# Patient Record
Sex: MALE | URBAN - METROPOLITAN AREA
[De-identification: names, ages, dates, MRNs, and addresses within clinical notes are randomized per-mention and may not be internally consistent; named-entity substitution may affect disease eponyms.]

---

## 2020-04-20 ENCOUNTER — APPOINTMENT (RX ONLY)
Dept: URBAN - METROPOLITAN AREA CLINIC 4 | Facility: CLINIC | Age: 55
Setting detail: DERMATOLOGY
End: 2020-04-20

## 2020-04-20 PROBLEM — C44.91 BASAL CELL CARCINOMA OF SKIN, UNSPECIFIED: Status: ACTIVE | Noted: 2020-04-20

## 2020-04-20 PROCEDURE — ? MOHS SURGERY PHONE CONSULTATION

## 2020-04-20 NOTE — PROCEDURE: MOHS SURGERY PHONE CONSULTATION
Does The Patient Have An Artificial Heart Valve?: No
Pathology Accession #: [id number]
Referring Provider: Asha Corrales MD
Detail Level: Simple
Which Antibiotic Do They Take For Surgical Prophylaxis?: Amoxicillin (2 grams)
Office Location Of Mohs Surgery: [facility]
Patient Reported Location: left upper lip
Has The Pathology Report Been Received?: Yes
Patient Preferred Phone Number: [phone]

## 2021-02-04 ENCOUNTER — OFFICE VISIT (OUTPATIENT)
Dept: URGENT CARE | Facility: CLINIC | Age: 56
End: 2021-02-04

## 2021-02-04 ENCOUNTER — HOSPITAL ENCOUNTER (OUTPATIENT)
Dept: LAB | Facility: MEDICAL CENTER | Age: 56
End: 2021-02-04
Attending: PHYSICIAN ASSISTANT

## 2021-02-04 VITALS
RESPIRATION RATE: 16 BRPM | OXYGEN SATURATION: 95 % | BODY MASS INDEX: 29.4 KG/M2 | TEMPERATURE: 97.5 F | HEART RATE: 80 BPM | HEIGHT: 71 IN | DIASTOLIC BLOOD PRESSURE: 88 MMHG | SYSTOLIC BLOOD PRESSURE: 136 MMHG | WEIGHT: 210 LBS

## 2021-02-04 DIAGNOSIS — R42 DIZZINESS: ICD-10-CM

## 2021-02-04 DIAGNOSIS — R11.2 NAUSEA AND VOMITING, INTRACTABILITY OF VOMITING NOT SPECIFIED, UNSPECIFIED VOMITING TYPE: ICD-10-CM

## 2021-02-04 LAB
ALBUMIN SERPL BCP-MCNC: 4.7 G/DL (ref 3.2–4.9)
ALBUMIN/GLOB SERPL: 1.6 G/DL
ALP SERPL-CCNC: 82 U/L (ref 30–99)
ALT SERPL-CCNC: 24 U/L (ref 2–50)
ANION GAP SERPL CALC-SCNC: 12 MMOL/L (ref 7–16)
AST SERPL-CCNC: 25 U/L (ref 12–45)
BASOPHILS # BLD AUTO: 0.2 % (ref 0–1.8)
BASOPHILS # BLD: 0.02 K/UL (ref 0–0.12)
BILIRUB SERPL-MCNC: 0.6 MG/DL (ref 0.1–1.5)
BUN SERPL-MCNC: 16 MG/DL (ref 8–22)
CALCIUM SERPL-MCNC: 10 MG/DL (ref 8.4–10.2)
CHLORIDE SERPL-SCNC: 101 MMOL/L (ref 96–112)
CO2 SERPL-SCNC: 26 MMOL/L (ref 20–33)
CREAT SERPL-MCNC: 1.12 MG/DL (ref 0.5–1.4)
EOSINOPHIL # BLD AUTO: 0.02 K/UL (ref 0–0.51)
EOSINOPHIL NFR BLD: 0.2 % (ref 0–6.9)
ERYTHROCYTE [DISTWIDTH] IN BLOOD BY AUTOMATED COUNT: 48.2 FL (ref 35.9–50)
GLOBULIN SER CALC-MCNC: 3 G/DL (ref 1.9–3.5)
GLUCOSE SERPL-MCNC: 103 MG/DL (ref 65–99)
HCT VFR BLD AUTO: 42.9 % (ref 42–52)
HGB BLD-MCNC: 14.1 G/DL (ref 14–18)
IMM GRANULOCYTES # BLD AUTO: 0.03 K/UL (ref 0–0.11)
IMM GRANULOCYTES NFR BLD AUTO: 0.3 % (ref 0–0.9)
LYMPHOCYTES # BLD AUTO: 2.21 K/UL (ref 1–4.8)
LYMPHOCYTES NFR BLD: 20.8 % (ref 22–41)
MCH RBC QN AUTO: 31.4 PG (ref 27–33)
MCHC RBC AUTO-ENTMCNC: 32.9 G/DL (ref 33.7–35.3)
MCV RBC AUTO: 95.5 FL (ref 81.4–97.8)
MONOCYTES # BLD AUTO: 0.61 K/UL (ref 0–0.85)
MONOCYTES NFR BLD AUTO: 5.7 % (ref 0–13.4)
NEUTROPHILS # BLD AUTO: 7.72 K/UL (ref 1.82–7.42)
NEUTROPHILS NFR BLD: 72.8 % (ref 44–72)
NRBC # BLD AUTO: 0 K/UL
NRBC BLD-RTO: 0 /100 WBC
PLATELET # BLD AUTO: 223 K/UL (ref 164–446)
PMV BLD AUTO: 9.6 FL (ref 9–12.9)
POTASSIUM SERPL-SCNC: 4.6 MMOL/L (ref 3.6–5.5)
PROT SERPL-MCNC: 7.7 G/DL (ref 6–8.2)
RBC # BLD AUTO: 4.49 M/UL (ref 4.7–6.1)
SODIUM SERPL-SCNC: 139 MMOL/L (ref 135–145)
WBC # BLD AUTO: 10.6 K/UL (ref 4.8–10.8)

## 2021-02-04 PROCEDURE — 80053 COMPREHEN METABOLIC PANEL: CPT

## 2021-02-04 PROCEDURE — 36415 COLL VENOUS BLD VENIPUNCTURE: CPT

## 2021-02-04 PROCEDURE — 99203 OFFICE O/P NEW LOW 30 MIN: CPT | Performed by: PHYSICIAN ASSISTANT

## 2021-02-04 PROCEDURE — 85025 COMPLETE CBC W/AUTO DIFF WBC: CPT

## 2021-02-04 RX ORDER — MECLIZINE HYDROCHLORIDE 25 MG/1
25 TABLET ORAL 3 TIMES DAILY PRN
Qty: 30 TAB | Refills: 0 | Status: SHIPPED | OUTPATIENT
Start: 2021-02-04 | End: 2021-11-08

## 2021-02-04 RX ORDER — ONDANSETRON 4 MG/1
4 TABLET, ORALLY DISINTEGRATING ORAL EVERY 6 HOURS PRN
Qty: 20 TAB | Refills: 0 | Status: SHIPPED | OUTPATIENT
Start: 2021-02-04 | End: 2021-11-08

## 2021-02-04 ASSESSMENT — ENCOUNTER SYMPTOMS
SINUS PAIN: 0
PHOTOPHOBIA: 0
WHEEZING: 0
SHORTNESS OF BREATH: 0
MYALGIAS: 0
DIARRHEA: 0
SORE THROAT: 0
NECK PAIN: 0
EYE PAIN: 0
WEAKNESS: 0
NAUSEA: 1
VOMITING: 1
DIZZINESS: 1
PALPITATIONS: 0
DOUBLE VISION: 0
CHILLS: 0
FEVER: 0
HEADACHES: 0
COUGH: 0
DIAPHORESIS: 0
TINGLING: 0
ABDOMINAL PAIN: 0
BLURRED VISION: 0

## 2021-02-04 NOTE — PATIENT INSTRUCTIONS
"Labyrinthitis    Labyrinthitis is an inner ear infection. The inner ear is a system of tubes and canals (labyrinth) that are filled with fluid. The inner ear also contains nerve cells that send hearing and balance signals to the brain. When tiny germs (microorganisms) get inside the labyrinth, they harm the cells that send messages to the brain. This can cause changes in hearing and balance.  Labyrinthitis usually develops suddenly and goes away with treatment in a few weeks (acute labyrinthitis). If the infection damages parts of the labyrinth, some symptoms may last for a long time (chronic labyrinthitis).  What are the causes?  Labyrinthitis is most often caused by a virus, such as one that causes:  · Infectious mononucleosis, also called \"mono.\"  · Measles.  · The flu.  · Herpes.  Labyrinthitis can also be caused by bacteria that spread from an infection in the brain or the middle ear (suppurative labyrinthitis). In some cases, the bacteria may produce a poison (toxin) that gets inside the labyrinth (serous labyrinthitis).  What increases the risk?  You may be at greater risk for labyrinthitis if you:  · Recently had a mouth, nose, or throat infection (upper respiratory infection) or an ear infection.  · Drink a lot of alcohol.  · Smoke.  · Use certain drugs.  · Are not well-rested (are fatigued).  · Are experiencing a lot of stress.  · Have allergies.  What are the signs or symptoms?  Symptoms of labyrinthitis usually start suddenly. The symptoms may range from mild to severe, and may include:  · Dizziness.  · Hearing loss.  · A feeling that you or your surroundings are moving when they are not (vertigo).  · Ringing in your ear (tinnitus).  · Nausea and vomiting.  · Trouble focusing your eyes.  Symptoms of chronic labyrinthitis may include:  · Fatigue.  · Confusion.  · Hearing loss.  · Tinnitus.  · Poor balance.  · Vertigo after sudden head movements.  How is this diagnosed?  This condition may be diagnosed " based on:  · Your symptoms and medical history. Your health care provider may ask about any dizziness or hearing loss you have and any recent upper respiratory infections.  · A physical exam that involves:  ? Checking your ears for infection.  ? Testing your balance.  ? Checking your eye movement.  · Hearing tests.  · Imaging tests, such as CT scan or MRI.  · Tests of your eye movements (electronystagmogram, ENG).  How is this treated?    Treatment depends on the cause. If your condition is caused by bacteria, you may need antibiotic medicine. If it is caused by a virus, it may get better on its own.  Regardless of the cause, you may be treated with:  · Medicines to:  ? Stop dizziness.  ? Relieve nausea.  ? Reduce inflammation.  ? Speed up your recovery.  · Rest. You may be asked to limit your activities until dizziness goes away.  · IV fluids. These may be given at a hospital. You may need IV fluids if you have severe nausea and vomiting.  · Physical therapy. A therapist can teach you exercises to help you adjust to feeling dizzy (vestibular rehabilitation exercises). You may need this if you have dizziness that does not go away.  Follow these instructions at home:  Medicines  · Take over-the-counter and prescription medicines only as told by your health care provider.  · If you were prescribed an antibiotic medicine, take it exactly as told by your health care provider. Do not stop taking the antibiotic even if you start to feel better.  Activity  · Rest as much as possible.  · Limit your activity as directed. Ask your health care provider what activities are safe for you.  · Do not make sudden movements until any dizziness goes away.  · If physical therapy was prescribed, do exercises as directed.  General instructions  · Avoid loud noises and bright lights.  · Do not drive until your health care provider says that this is safe for you.  · Drink enough fluid to keep your urine pale yellow.  · Keep all follow-up  visits as told by your health care provider. This is important.  Contact a health care provider if you have:  · Symptoms that do not get better with medicine.  · Symptoms that last longer than two weeks.  · A fever.  Get help right away if you have:  · Nausea or vomiting that is severe or does not go away.  · Severe dizziness.  · Sudden hearing loss.  Summary  · Labyrinthitis is an infection of the inner ear. It can cause changes in hearing and balance.  · Symptoms usually start suddenly and include dizziness, hearing loss, and nausea and vomiting. You may also have ringing in your ear (tinnitus), trouble focusing your eyes, and a feeling that you or your surroundings are moving when they are not (vertigo).  · If the condition lasts more than a few weeks, symptoms may include fatigue, confusion, hearing loss, poor balance, tinnitus, and vertigo.  · Treatment depends on the cause. If your labyrinthitis is caused by bacteria, you may need antibiotic medicine. If your labyrinthitis is caused by a virus, it may get better on its own.  · Follow your health care provider's instructions, including how to take medicines, what activities to avoid, and when to get medical help.  This information is not intended to replace advice given to you by your health care provider. Make sure you discuss any questions you have with your health care provider.  Document Released: 01/08/2016 Document Revised: 10/07/2019 Document Reviewed: 12/29/2018  Elsevier Patient Education © 2020 [employer]  Meniere Disease    Meniere disease is an inner ear disorder. It causes attacks of a spinning sensation (vertigo), dizziness, and ringing in the ear (tinnitus). It also causes hearing loss and a feeling of fullness or pressure in the ear. This is a lifelong condition, and it may get worse over time.  You may have drop attacks or severe dizziness that makes you fall. A drop attack is when you suddenly fall without losing consciousness and you  quickly recover after a few seconds or minutes.  What are the causes?  This condition is caused by having too much of the fluid that is in your inner ear (endolymph). When fluid builds up in your inner ear, it affects the nerves that control balance and hearing. The reason for the fluid buildup is not known. Possible causes include:  · Allergies.  · An abnormal reaction of the body’s defense system (autoimmune disease).  · Viral infection of the inner ear.  · Head injury.  What increases the risk?  You are more likely to develop this condition if:  · You are older than age 40.  · You have a family history of Meniere disease.  · You have a history of autoimmune disease.  · You have a history of migraine headaches.  What are the signs or symptoms?  Symptoms of this condition can come and go and may last for up to 4 hours at a time. Symptoms usually start in one ear. They may become more frequent and eventually involve both ears. Symptoms can include:  · Fullness and pressure in your ear.  · Roaring or ringing in your ear.  · Vertigo and loss of balance.  · Dizziness.  · Decreased hearing.  · Nausea and vomiting.  How is this diagnosed?  This condition is diagnosed based on:  · A physical exam.  · Tests , such as:  ? A hearing test (audiogram).  ? An electronystagmogram. This tests your balance nerve (vestibular nerve).  ? Imaging studies of your inner ear, such as CT scan or MRI.  ? Other balance tests, such as rotational or balance platform tests.  How is this treated?  There is no cure for this condition, but treatment can help to manage your symptoms. Treatment may include:  · A low-salt diet. Limiting salt may help to reduce fluid in the body and relieve symptoms.  · Oral or injected medicines to reduce or control:  ? Vertigo.  ? Nausea.  ? Fluid retention.  ? Dizziness.  · Use of an air pressure pulse generator. This is a machine that sends small pressure pulses into your ear canal.  · Hearing aids.  · Inner ear  surgery. This is rare.  When you have symptoms, it can be helpful to lie down on a flat surface and focus your eyes on one object that does not move. Try to stay in that position until your symptoms go away.  Follow these instructions at home:  Eating and drinking  · Eat the same amount of food at the same time every day, including snacks.  · Do not skip meals.  · Avoid caffeine.  · Drink enough fluids to keep your urine clear or pale yellow.  · Limit alcoholic drinks to one drink a day for non-pregnant women and 2 drinks a day for men. One drink equals 12 oz of beer, 5 oz of wine, or 1½ oz of hard liquor.  · Limit the salt (sodium) in your diet as told by your health care provider. Check ingredients and nutrition facts on packaged foods and beverages.  · Do not eat foods that contain monosodium glutamate (MSG).  General instructions  · Do not use any products that contain nicotine or tobacco, such as cigarettes and e-cigarettes. If you need help quitting, ask your health care provider.  · Take over-the-counter and prescription medicines only as told by your health care provider.  · Find ways to reduce or avoid stress. If you need help with this, ask your health care provider.  · Do not drive if you have vertigo or dizziness.  Contact a health care provider if:  · You have symptoms that last longer than 4 hours.  · You have new or worse symptoms.  Get help right away if:  · You have been vomiting for 24 hours.  · You cannot keep fluids down.  · You have chest pain or trouble breathing.  Summary  · Meniere disease is an inner ear disorder. It causes attacks of a spinning sensation (vertigo), dizziness, and ringing in the ear (tinnitus). It also causes hearing loss and a feeling of fullness or pressure in the ear.  · Symptoms of this condition can come and go and may last for up to 4 hours at a time.  · When you have symptoms, it can be helpful to lie down on a flat surface and focus your eyes on one object that does  not move. Try to stay in that position until your symptoms go away.  This information is not intended to replace advice given to you by your health care provider. Make sure you discuss any questions you have with your health care provider.  Document Released: 12/15/2001 Document Revised: 11/30/2018 Document Reviewed: 11/08/2017  Elsevier Patient Education © 2020 Elsevier Inc.  Vertigo  Vertigo is the feeling that you or your surroundings are moving when they are not. This feeling can come and go at any time. Vertigo often goes away on its own. Vertigo can be dangerous if it occurs while you are doing something that could endanger you or others, such as driving or operating machinery.  Your health care provider will do tests to determine the cause of your vertigo. Tests will also help your health care provider decide how best to treat your condition.  Follow these instructions at home:  Eating and drinking         · Drink enough fluid to keep your urine pale yellow.  · Do not drink alcohol.  Activity  · Return to your normal activities as told by your health care provider. Ask your health care provider what activities are safe for you.  · In the morning, first sit up on the side of the bed. When you feel okay, stand slowly while you hold onto something until you know that your balance is fine.  · Move slowly. Avoid sudden body or head movements or certain positions, as told by your health care provider.  · If you have trouble walking or keeping your balance, try using a cane for stability. If you feel dizzy or unstable, sit down right away.  · Avoid doing any tasks that would cause danger to you or others if vertigo occurs.  · Avoid bending down if you feel dizzy. Place items in your home so that they are easy for you to reach without leaning over.  · Do not drive or use heavy machinery if you feel dizzy.  General instructions  · Take over-the-counter and prescription medicines only as told by your health care  provider.  · Keep all follow-up visits as told by your health care provider. This is important.  Contact a health care provider if:  · Your medicines do not relieve your vertigo or they make it worse.  · You have a fever.  · Your condition gets worse or you develop new symptoms.  · Your family or friends notice any behavioral changes.  · Your nausea or vomiting gets worse.  · You have numbness or a prickling and tingling sensation in part of your body.  Get help right away if you:  · Have difficulty moving or speaking.  · Are always dizzy.  · Faint.  · Develop severe headaches.  · Have weakness in your hands, arms, or legs.  · Have changes in your hearing or vision.  · Develop a stiff neck.  · Develop sensitivity to light.  Summary  · Vertigo is the feeling that you or your surroundings are moving when they are not.  · Your health care provider will do tests to determine the cause of your vertigo.  · Follow instructions for home care. You may be told to avoid certain tasks, positions, or movements.  · Contact a health care provider if your medicines do not relieve your symptoms, or if you have a fever, nausea, vomiting, or changes in behavior.  · Get help right away if you have severe headaches or difficulty speaking, or you develop hearing or vision problems.  This information is not intended to replace advice given to you by your health care provider. Make sure you discuss any questions you have with your health care provider.  Document Released: 09/27/2006 Document Revised: 11/11/2019 Document Reviewed: 11/11/2019  [employer] Patient Education © 2020 Elsevier Inc.

## 2021-02-04 NOTE — PROGRESS NOTES
Subjective:   Brandon Spaulding is a 55 y.o. male who presents for Dizziness (x 2 weeks off / on, dizziness, cold sweats.  Vomiting last night)    HPI:  This is a very pleasant 55-year-old male presenting to the clinic with intermittent bouts of dizziness and nausea x2 weeks.  Patient states his dizziness started randomly.  He describes random episodes of dizziness lasting seconds to minutes then resolving.  He describes his dizziness episodes as feeling off balance/the room spinning.  Occasionally he has some mild cold sweats involved.  He has had a couple instances where he vomited due to the dizziness.  The dizziness episodes do not seem to be brought on with movement.  He denies any lightheadedness or presyncope.  Denies any associated chest pain, shortness of breath, headache, paresthesias or visual changes.  He has not started any new medications.  He does describe some intermittent popping in both his ears as well as some tendinitis he states he has had this for years.  Denies any ear pain, fevers, chills or myalgias.  Denies any preceding upper respiratory infection.      Review of Systems   Constitutional: Negative for chills, diaphoresis, fever and malaise/fatigue.   HENT: Positive for tinnitus. Negative for congestion, ear pain, hearing loss, sinus pain and sore throat.    Eyes: Negative for blurred vision, double vision, photophobia and pain.   Respiratory: Negative for cough, shortness of breath and wheezing.    Cardiovascular: Negative for chest pain and palpitations.   Gastrointestinal: Positive for nausea and vomiting. Negative for abdominal pain and diarrhea.   Musculoskeletal: Negative for myalgias and neck pain.   Neurological: Positive for dizziness. Negative for tingling, weakness and headaches.       Medications:    • meclizine Tabs  • ondansetron Tbdp    Allergies: Anesthesia s-i-40 [kdc:egg phospholipids+sodium metabisulfite+soybean oil+propofol]    Problem List: Brandon Spaulding does not have a  "problem list on file.    Surgical History:  No past surgical history on file.    Past Social Hx: Brandon Spaulding  reports that he has never smoked. He has never used smokeless tobacco.     Past Family Hx:  Brandon Spaulding family history is not on file.     Problem list, medications, and allergies reviewed by myself today in Epic.     Objective:     /88 (BP Location: Left arm, Patient Position: Sitting, BP Cuff Size: Large adult)   Pulse 80   Temp 36.4 °C (97.5 °F) (Temporal)   Resp 16   Ht 1.803 m (5' 11\")   Wt 95.3 kg (210 lb)   SpO2 95%   BMI 29.29 kg/m²     Physical Exam  Constitutional:       General: He is not in acute distress.     Appearance: Normal appearance. He is not ill-appearing, toxic-appearing or diaphoretic.   HENT:      Head: Normocephalic and atraumatic.      Right Ear: Ear canal and external ear normal.      Left Ear: Ear canal and external ear normal.      Ears:      Comments: Middle ear effusions bilaterally.  TMs nonerythematous or bulging.     Nose: Nose normal. No congestion or rhinorrhea.      Mouth/Throat:      Mouth: Mucous membranes are moist.      Pharynx: No oropharyngeal exudate or posterior oropharyngeal erythema.   Eyes:      Extraocular Movements: Extraocular movements intact.      Conjunctiva/sclera: Conjunctivae normal.      Pupils: Pupils are equal, round, and reactive to light.   Neck:      Musculoskeletal: Normal range of motion. No neck rigidity or muscular tenderness.      Vascular: No carotid bruit.   Cardiovascular:      Rate and Rhythm: Normal rate and regular rhythm.      Pulses: Normal pulses.      Heart sounds: Normal heart sounds.   Pulmonary:      Effort: Pulmonary effort is normal.      Breath sounds: Wheezing (Mild wheezing appreciated in all lobes.) present.   Abdominal:      General: Bowel sounds are normal.      Palpations: Abdomen is soft.      Tenderness: There is no abdominal tenderness. There is no guarding.   Lymphadenopathy:      Cervical: No " cervical adenopathy.   Skin:     General: Skin is warm and dry.      Capillary Refill: Capillary refill takes less than 2 seconds.   Neurological:      General: No focal deficit present.      Mental Status: He is alert and oriented to person, place, and time. Mental status is at baseline.      Cranial Nerves: No cranial nerve deficit.      Sensory: No sensory deficit.      Motor: No weakness.      Coordination: Coordination normal.      Gait: Gait normal.      Comments: Negative Rocio-Hallpike   Psychiatric:         Mood and Affect: Mood normal.         Thought Content: Thought content normal.           Assessment/Plan:     Diagnosis and associated orders:     1. Dizziness  meclizine (ANTIVERT) 25 MG Tab    CBC WITH DIFFERENTIAL    Comp Metabolic Panel   2. Nausea and vomiting, intractability of vomiting not specified, unspecified vomiting type  ondansetron (ZOFRAN ODT) 4 MG TABLET DISPERSIBLE    CBC WITH DIFFERENTIAL    Comp Metabolic Panel      Comments/MDM:       • Differential diagnoses and treatment options were discussed with the patient at length today.  Differentials include vertigo versus electrolyte imbalance versus inner ear disorder.  Patient had normal and stable vital signs.  Negative Odell-Hallpike in clinic.  Normal neurological exam.  Normal heart sounds.  Mild wheezing appreciated in all lung fields are the patient is a longtime smoker.  He had middle ear effusions bilaterally.  At this time I recommended starting Flonase 2 sprays per nostril 1 time daily.  May also use meclizine and Zofran as directed for dizziness and nausea.  He has not had blood work done in many years I will order some basic lab work and follow-up once available.  A referral was also placed to follow-up with ENT child if symptoms persist.  Discussed potential red flag symptoms that would warrant emergency department follow-up.  The patient agreed and understood this plan of care.           Differential diagnosis, natural history,  supportive care, and indications for immediate follow-up discussed.    Advised the patient to follow-up with the primary care physician for recheck, reevaluation, and consideration of further management.    Please note that this dictation was created using voice recognition software. I have made reasonable attempt to correct obvious errors, but I expect that there are errors of grammar and possibly content that I did not discover before finalizing the note.    This note was electronically signed by SANDY Carver PA-C

## 2021-03-15 DIAGNOSIS — Z23 NEED FOR VACCINATION: ICD-10-CM

## 2021-03-25 ENCOUNTER — TELEPHONE (OUTPATIENT)
Dept: URGENT CARE | Facility: CLINIC | Age: 56
End: 2021-03-25

## 2021-03-25 DIAGNOSIS — R11.2 NAUSEA AND VOMITING, INTRACTABILITY OF VOMITING NOT SPECIFIED, UNSPECIFIED VOMITING TYPE: ICD-10-CM

## 2021-03-25 DIAGNOSIS — R42 DIZZINESS: ICD-10-CM

## 2021-03-25 RX ORDER — MECLIZINE HYDROCHLORIDE 25 MG/1
25 TABLET ORAL 3 TIMES DAILY PRN
Qty: 60 TABLET | Refills: 0 | Status: SHIPPED | OUTPATIENT
Start: 2021-03-25 | End: 2021-11-08

## 2021-03-25 RX ORDER — ONDANSETRON 4 MG/1
4 TABLET, ORALLY DISINTEGRATING ORAL EVERY 6 HOURS PRN
Qty: 20 TABLET | Refills: 0 | Status: SHIPPED | OUTPATIENT
Start: 2021-03-25 | End: 2021-11-08

## 2021-03-25 NOTE — TELEPHONE ENCOUNTER
Patient called office and left a voice message, patient stated he was seen on Feb 4, he got referral for ENT and his appt is until April 21st, patient state he already run out of the medications he was prescribe Meclizine and zofran and he likes to know if you can send a refill.  If you decide to do the refill patient is requesting a call back at [phone] to let you know what pharmacy he likes to use. He is going to be out of town for 1 wk. Please advice.

## 2021-03-25 NOTE — PROGRESS NOTES
Spoke with the patient this afternoon.  His symptoms fully improved for a few weeks following his last visit at the beginning of February.  Over the last week or 2 they have returned.  He is scheduled to follow-up with ENT next month.  Is requesting a refill of his previous medications.  I sent his refill into the pharmacy in [place].  Recommended him performing the Epley maneuver.  Discussed red flag symptoms that would warrant emergent follow-up.  Encouraged to call with any questions or concerns.

## 2021-07-06 ENCOUNTER — TELEPHONE (OUTPATIENT)
Dept: SCHEDULING | Facility: IMAGING CENTER | Age: 56
End: 2021-07-06

## 2021-09-20 ENCOUNTER — TELEPHONE (OUTPATIENT)
Dept: SCHEDULING | Facility: IMAGING CENTER | Age: 56
End: 2021-09-20

## 2021-11-05 SDOH — HEALTH STABILITY: PHYSICAL HEALTH: ON AVERAGE, HOW MANY DAYS PER WEEK DO YOU ENGAGE IN MODERATE TO STRENUOUS EXERCISE (LIKE A BRISK WALK)?: 3 DAYS

## 2021-11-05 SDOH — ECONOMIC STABILITY: TRANSPORTATION INSECURITY
IN THE PAST 12 MONTHS, HAS LACK OF RELIABLE TRANSPORTATION KEPT YOU FROM MEDICAL APPOINTMENTS, MEETINGS, WORK OR FROM GETTING THINGS NEEDED FOR DAILY LIVING?: NO

## 2021-11-05 SDOH — ECONOMIC STABILITY: FOOD INSECURITY: WITHIN THE PAST 12 MONTHS, YOU WORRIED THAT YOUR FOOD WOULD RUN OUT BEFORE YOU GOT MONEY TO BUY MORE.: NEVER TRUE

## 2021-11-05 SDOH — ECONOMIC STABILITY: INCOME INSECURITY: IN THE LAST 12 MONTHS, WAS THERE A TIME WHEN YOU WERE NOT ABLE TO PAY THE MORTGAGE OR RENT ON TIME?: NO

## 2021-11-05 SDOH — ECONOMIC STABILITY: HOUSING INSECURITY

## 2021-11-05 SDOH — ECONOMIC STABILITY: HOUSING INSECURITY
IN THE LAST 12 MONTHS, WAS THERE A TIME WHEN YOU DID NOT HAVE A STEADY PLACE TO SLEEP OR SLEPT IN A SHELTER (INCLUDING NOW)?: NO

## 2021-11-05 SDOH — HEALTH STABILITY: MENTAL HEALTH
STRESS IS WHEN SOMEONE FEELS TENSE, NERVOUS, ANXIOUS, OR CAN'T SLEEP AT NIGHT BECAUSE THEIR MIND IS TROUBLED. HOW STRESSED ARE YOU?: RATHER MUCH

## 2021-11-05 SDOH — ECONOMIC STABILITY: TRANSPORTATION INSECURITY
IN THE PAST 12 MONTHS, HAS LACK OF TRANSPORTATION KEPT YOU FROM MEETINGS, WORK, OR FROM GETTING THINGS NEEDED FOR DAILY LIVING?: NO

## 2021-11-05 SDOH — ECONOMIC STABILITY: INCOME INSECURITY: HOW HARD IS IT FOR YOU TO PAY FOR THE VERY BASICS LIKE FOOD, HOUSING, MEDICAL CARE, AND HEATING?: NOT HARD AT ALL

## 2021-11-05 SDOH — ECONOMIC STABILITY: FOOD INSECURITY: WITHIN THE PAST 12 MONTHS, THE FOOD YOU BOUGHT JUST DIDN'T LAST AND YOU DIDN'T HAVE MONEY TO GET MORE.: NEVER TRUE

## 2021-11-05 SDOH — ECONOMIC STABILITY: TRANSPORTATION INSECURITY
IN THE PAST 12 MONTHS, HAS THE LACK OF TRANSPORTATION KEPT YOU FROM MEDICAL APPOINTMENTS OR FROM GETTING MEDICATIONS?: NO

## 2021-11-05 SDOH — HEALTH STABILITY: PHYSICAL HEALTH: ON AVERAGE, HOW MANY MINUTES DO YOU ENGAGE IN EXERCISE AT THIS LEVEL?: 30 MIN

## 2021-11-05 ASSESSMENT — SOCIAL DETERMINANTS OF HEALTH (SDOH)
HOW MANY DRINKS CONTAINING ALCOHOL DO YOU HAVE ON A TYPICAL DAY WHEN YOU ARE DRINKING: 1 OR 2
HOW OFTEN DO YOU GET TOGETHER WITH FRIENDS OR RELATIVES?: NEVER
DO YOU BELONG TO ANY CLUBS OR ORGANIZATIONS SUCH AS CHURCH GROUPS UNIONS, FRATERNAL OR ATHLETIC GROUPS, OR SCHOOL GROUPS?: YES
IN A TYPICAL WEEK, HOW MANY TIMES DO YOU TALK ON THE PHONE WITH FAMILY, FRIENDS, OR NEIGHBORS?: MORE THAN THREE TIMES A WEEK
IN A TYPICAL WEEK, HOW MANY TIMES DO YOU TALK ON THE PHONE WITH FAMILY, FRIENDS, OR NEIGHBORS?: MORE THAN THREE TIMES A WEEK
HOW OFTEN DO YOU HAVE SIX OR MORE DRINKS ON ONE OCCASION: NEVER
WITHIN THE PAST 12 MONTHS, YOU WORRIED THAT YOUR FOOD WOULD RUN OUT BEFORE YOU GOT THE MONEY TO BUY MORE: NEVER TRUE
HOW HARD IS IT FOR YOU TO PAY FOR THE VERY BASICS LIKE FOOD, HOUSING, MEDICAL CARE, AND HEATING?: NOT HARD AT ALL
DO YOU BELONG TO ANY CLUBS OR ORGANIZATIONS SUCH AS CHURCH GROUPS UNIONS, FRATERNAL OR ATHLETIC GROUPS, OR SCHOOL GROUPS?: YES
HOW OFTEN DO YOU GET TOGETHER WITH FRIENDS OR RELATIVES?: NEVER
HOW OFTEN DO YOU HAVE A DRINK CONTAINING ALCOHOL: MONTHLY OR LESS
HOW OFTEN DO YOU ATTEND CHURCH OR RELIGIOUS SERVICES?: MORE THAN 4 TIMES PER YEAR
HOW OFTEN DO YOU ATTENT MEETINGS OF THE CLUB OR ORGANIZATION YOU BELONG TO?: MORE THAN 4 TIMES PER YEAR
HOW OFTEN DO YOU ATTENT MEETINGS OF THE CLUB OR ORGANIZATION YOU BELONG TO?: MORE THAN 4 TIMES PER YEAR
HOW OFTEN DO YOU ATTEND CHURCH OR RELIGIOUS SERVICES?: MORE THAN 4 TIMES PER YEAR

## 2021-11-05 ASSESSMENT — LIFESTYLE VARIABLES
HOW MANY STANDARD DRINKS CONTAINING ALCOHOL DO YOU HAVE ON A TYPICAL DAY: 1 OR 2
HOW OFTEN DO YOU HAVE A DRINK CONTAINING ALCOHOL: MONTHLY OR LESS
HOW OFTEN DO YOU HAVE SIX OR MORE DRINKS ON ONE OCCASION: NEVER

## 2021-11-08 ENCOUNTER — TELEPHONE (OUTPATIENT)
Dept: MEDICAL GROUP | Facility: PHYSICIAN GROUP | Age: 56
End: 2021-11-08

## 2021-11-08 ENCOUNTER — OFFICE VISIT (OUTPATIENT)
Dept: MEDICAL GROUP | Facility: PHYSICIAN GROUP | Age: 56
End: 2021-11-08
Payer: COMMERCIAL

## 2021-11-08 VITALS
HEART RATE: 68 BPM | HEIGHT: 71 IN | DIASTOLIC BLOOD PRESSURE: 80 MMHG | WEIGHT: 189.2 LBS | OXYGEN SATURATION: 99 % | SYSTOLIC BLOOD PRESSURE: 138 MMHG | TEMPERATURE: 97.2 F | BODY MASS INDEX: 26.49 KG/M2

## 2021-11-08 DIAGNOSIS — R21 RASH: ICD-10-CM

## 2021-11-08 DIAGNOSIS — K40.90 UNILATERAL INGUINAL HERNIA WITHOUT OBSTRUCTION OR GANGRENE, RECURRENCE NOT SPECIFIED: ICD-10-CM

## 2021-11-08 DIAGNOSIS — H93.13 TINNITUS OF BOTH EARS: ICD-10-CM

## 2021-11-08 DIAGNOSIS — Z00.00 WELL ADULT EXAM: ICD-10-CM

## 2021-11-08 DIAGNOSIS — L29.0 PRURITUS ANI: ICD-10-CM

## 2021-11-08 PROCEDURE — 99214 OFFICE O/P EST MOD 30 MIN: CPT | Performed by: FAMILY MEDICINE

## 2021-11-08 RX ORDER — CALCIUM CARBONATE 260MG(650)
TABLET,CHEWABLE ORAL
COMMUNITY
Start: 2018-02-06

## 2021-11-08 RX ORDER — NYSTATIN 100000 U/G
1 CREAM TOPICAL 2 TIMES DAILY
Qty: 60 APPLICATION | Refills: 0 | Status: SHIPPED | OUTPATIENT
Start: 2021-11-08 | End: 2021-12-08

## 2021-11-08 RX ORDER — NEOMYCIN SULFATE, POLYMYXIN B SULFATE, AND DEXAMETHASONE 3.5; 10000; 1 MG/G; [USP'U]/G; MG/G
OINTMENT OPHTHALMIC
COMMUNITY
Start: 2021-10-30

## 2021-11-08 ASSESSMENT — PATIENT HEALTH QUESTIONNAIRE - PHQ9: CLINICAL INTERPRETATION OF PHQ2 SCORE: 0

## 2021-11-08 ASSESSMENT — FIBROSIS 4 INDEX: FIB4 SCORE: 1.33

## 2021-11-08 NOTE — TELEPHONE ENCOUNTER
VOICEMAIL  1. Caller Name: Brandon Spaulding                        Call Back Number: [phone]    2. Message: Patient LVM stating he was seen earlier today. Patient wanted to know if he needed new order because his insurance does not cover [facility] and he will be needing to go to [facility] instead.    3. Patient approves office to leave a detailed voicemail/MyChart message: N\A

## 2021-11-08 NOTE — ASSESSMENT & PLAN NOTE
Chronic issue  Has had a longstanding lump to right inguinal region. Growing in size  Would like evaluation

## 2021-11-08 NOTE — TELEPHONE ENCOUNTER
Phone Number Called: [phone]    Call outcome: Did not leave a detailed message. Requested patient to call back.    Message: LVM to please call back in regards to VM. Patient will Just have to take the paper order he got today when he goes to [facility],

## 2021-11-08 NOTE — PROGRESS NOTES
Subjective:     CC:  Diagnoses of Tinnitus of both ears, Rash, Pruritus ani, Well adult exam, and Unilateral inguinal hernia without obstruction or gangrene, recurrence not specified were pertinent to this visit.    HISTORY OF THE PRESENT ILLNESS: Patient is a 56 y.o. male. This pleasant patient is here today to establish care and discuss the following problems.     Tinnitus of both ears  Chronic issue  Has had tinnitus for a long time  Denies any hearing loss. Had recent hearing test.   Also endorses eustachian tube dysfunction       Pruritus ani  Chronic issue  Has had a perianal rash and pruritis   He has had to shower twice daily to keep it calm  Has tried to increase his fiber intake   Has tried buttpaste and vaseline which helps some and relieves the itchiness.     Inguinal hernia  Chronic issue  Has had a longstanding lump to right inguinal region. Growing in size  Would like evaluation       Allergies: Anesthesia s-i-40 [kdc:egg phospholipids+sodium metabisulfite+soybean oil+propofol]    Current Outpatient Medications Ordered in Epic   Medication Sig Dispense Refill   • neomycin-polymixin-dexamethasone (MAXITROL) 3.5-46650-4.1 Ointment ophthalmic ointment      • Zinc 10 MG Lozenge      • Vitamin D-Vitamin K (VITAMIN K2-VITAMIN D3)  MCG-UNIT Cap      • Magnesium 100 MG Cap      • nystatin (MYCOSTATIN) [id number] UNIT/GM Cream topical cream Apply 1 g topically 2 times a day for 30 days. 60 Application 0     No current Epic-ordered facility-administered medications on file.       History reviewed. No pertinent past medical history.      Health Maintenance: Completed    ROS:   Gen: no fevers/chills, no changes in weight  Eyes: no changes in vision  ENT: no sore throat, no hearing loss, no bloody nose  Pulm: no sob, no cough  CV: no chest pain, no palpitations  GI: no nausea/vomiting, no diarrhea  Skin: no rash      Objective:     Exam: /80 (BP Location: Right arm, Patient Position: Sitting, BP Cuff  "Size: Adult)   Pulse 68   Temp 36.2 °C (97.2 °F) (Temporal)   Ht 1.803 m (5' 11\")   Wt 85.8 kg (189 lb 3.2 oz)   SpO2 99%  Body mass index is 26.39 kg/m².      General: Normal appearing. No distress.  Pulmonary: Clear to ausculation.  Normal effort. No rales, ronchi, or wheezing.  Cardiovascular: Regular rate and rhythm without murmur. Radial pulses are intact and equal bilaterally.  Abdomen: Soft, nontender, nondistended. Normal bowel sounds. Liver and spleen are not palpable  Neurologic: Moves all extremities  Skin: Inspection of the perianal region shows evidence of an erythematous rash with mild skin breakdown at the gluteal fold.  No satellite lesions.  Musculoskeletal: Normal gait. No extremity cyanosis, clubbing, or edema.  There is a right pelvic lump that is compressible.  Psych: Normal mood and affect. Alert and oriented x3. Judgment and insight is normal.      Assessment & Plan:   56 y.o. male with the following -    1. Tinnitus of both ears  This is a chronic condition.  There may be an element of eustachian tube dysfunction.  I recommend a trial of Allegra-D.    2. Rash  3. Pruritus ani  Chronic condition.  I would like to do a trial of nystatin cream.  We will reassess in 3 months.  Small threshold for referral to dermatology.  - nystatin (MYCOSTATIN) [id number] UNIT/GM Cream topical cream; Apply 1 g topically 2 times a day for 30 days.  Dispense: 60 Application; Refill: 0    4. Well adult exam  - nystatin (MYCOSTATIN) [id number] UNIT/GM Cream topical cream; Apply 1 g topically 2 times a day for 30 days.  Dispense: 60 Application; Refill: 0  - CBC WITHOUT DIFFERENTIAL; Future  - Comp Metabolic Panel; Future  - HEMOGLOBIN A1C; Future  - Lipid Profile; Future    5. Unilateral inguinal hernia without obstruction or gangrene, recurrence not specified  Chronic condition.  I will proceed with ultrasonography to further evaluate for possible hernia.  - US-INGUINAL HERNIA; Future      Return in about 3 months " (around 2/8/2022).    Please note that this dictation was created using voice recognition software. I have made every reasonable attempt to correct obvious errors, but I expect that there are errors of grammar and possibly content that I did not discover before finalizing the note.

## 2021-11-08 NOTE — ASSESSMENT & PLAN NOTE
Chronic issue  Has had a perianal rash and pruritis   He has had to shower twice daily to keep it calm  Has tried to increase his fiber intake   Has tried buttpaste and vaseline which helps some and relieves the itchiness.

## 2021-11-09 LAB
ALBUMIN SERPL-MCNC: 4.6 G/DL (ref 3.8–4.9)
ALBUMIN/GLOB SERPL: 1.8 {RATIO} (ref 1.2–2.2)
ALP SERPL-CCNC: 81 IU/L (ref 44–121)
ALT SERPL-CCNC: 20 IU/L (ref 0–44)
AST SERPL-CCNC: 17 IU/L (ref 0–40)
BASOPHILS # BLD AUTO: 0 X10E3/UL (ref 0–0.2)
BASOPHILS NFR BLD AUTO: 0 %
BILIRUB SERPL-MCNC: 0.4 MG/DL (ref 0–1.2)
BUN SERPL-MCNC: 12 MG/DL (ref 6–24)
BUN/CREAT SERPL: 11 (ref 9–20)
CALCIUM SERPL-MCNC: 9.6 MG/DL (ref 8.7–10.2)
CHLORIDE SERPL-SCNC: 105 MMOL/L (ref 96–106)
CHOLEST SERPL-MCNC: 206 MG/DL (ref 100–199)
CO2 SERPL-SCNC: 22 MMOL/L (ref 20–29)
CREAT SERPL-MCNC: 1.06 MG/DL (ref 0.76–1.27)
EOSINOPHIL # BLD AUTO: 0.1 X10E3/UL (ref 0–0.4)
EOSINOPHIL NFR BLD AUTO: 1 %
ERYTHROCYTE [DISTWIDTH] IN BLOOD BY AUTOMATED COUNT: 13.1 % (ref 11.6–15.4)
GLOBULIN SER CALC-MCNC: 2.6 G/DL (ref 1.5–4.5)
GLUCOSE SERPL-MCNC: 105 MG/DL (ref 65–99)
HBA1C MFR BLD: 5.9 % (ref 4.8–5.6)
HCT VFR BLD AUTO: 41.9 % (ref 37.5–51)
HDLC SERPL-MCNC: 41 MG/DL
HGB BLD-MCNC: 14.3 G/DL (ref 13–17.7)
IMM GRANULOCYTES # BLD AUTO: 0 X10E3/UL (ref 0–0.1)
IMM GRANULOCYTES NFR BLD AUTO: 0 %
IMMATURE CELLS  115398: NORMAL
LABORATORY COMMENT REPORT: ABNORMAL
LDLC SERPL CALC-MCNC: 154 MG/DL (ref 0–99)
LYMPHOCYTES # BLD AUTO: 1.7 X10E3/UL (ref 0.7–3.1)
LYMPHOCYTES NFR BLD AUTO: 24 %
MCH RBC QN AUTO: 32.1 PG (ref 26.6–33)
MCHC RBC AUTO-ENTMCNC: 34.1 G/DL (ref 31.5–35.7)
MCV RBC AUTO: 94 FL (ref 79–97)
MONOCYTES # BLD AUTO: 0.5 X10E3/UL (ref 0.1–0.9)
MONOCYTES NFR BLD AUTO: 6 %
MORPHOLOGY BLD-IMP: NORMAL
NEUTROPHILS # BLD AUTO: 4.8 X10E3/UL (ref 1.4–7)
NEUTROPHILS NFR BLD AUTO: 69 %
NRBC BLD AUTO-RTO: NORMAL %
PLATELET # BLD AUTO: 195 X10E3/UL (ref 150–450)
POTASSIUM SERPL-SCNC: 4.9 MMOL/L (ref 3.5–5.2)
PROT SERPL-MCNC: 7.2 G/DL (ref 6–8.5)
RBC # BLD AUTO: 4.45 X10E6/UL (ref 4.14–5.8)
SODIUM SERPL-SCNC: 140 MMOL/L (ref 134–144)
TRIGL SERPL-MCNC: 61 MG/DL (ref 0–149)
VLDLC SERPL CALC-MCNC: 11 MG/DL (ref 5–40)
WBC # BLD AUTO: 7 X10E3/UL (ref 3.4–10.8)

## 2021-11-17 ENCOUNTER — HOSPITAL ENCOUNTER (OUTPATIENT)
Dept: RADIOLOGY | Facility: MEDICAL CENTER | Age: 56
End: 2021-11-17
Attending: FAMILY MEDICINE
Payer: COMMERCIAL

## 2021-11-17 DIAGNOSIS — K40.90 UNILATERAL INGUINAL HERNIA WITHOUT OBSTRUCTION OR GANGRENE, RECURRENCE NOT SPECIFIED: ICD-10-CM

## 2021-11-17 PROCEDURE — 76857 US EXAM PELVIC LIMITED: CPT

## 2021-11-18 DIAGNOSIS — K40.90 INGUINAL HERNIA WITHOUT OBSTRUCTION OR GANGRENE, RECURRENCE NOT SPECIFIED, UNSPECIFIED LATERALITY: ICD-10-CM

## 2022-02-25 ENCOUNTER — OFFICE VISIT (OUTPATIENT)
Dept: MEDICAL GROUP | Facility: PHYSICIAN GROUP | Age: 57
End: 2022-02-25
Payer: COMMERCIAL

## 2022-02-25 VITALS
DIASTOLIC BLOOD PRESSURE: 80 MMHG | SYSTOLIC BLOOD PRESSURE: 126 MMHG | TEMPERATURE: 97.2 F | BODY MASS INDEX: 26.74 KG/M2 | RESPIRATION RATE: 18 BRPM | HEIGHT: 71 IN | HEART RATE: 67 BPM | WEIGHT: 191 LBS | OXYGEN SATURATION: 97 %

## 2022-02-25 DIAGNOSIS — Z12.11 SCREENING FOR COLORECTAL CANCER: ICD-10-CM

## 2022-02-25 DIAGNOSIS — Z12.12 SCREENING FOR COLORECTAL CANCER: ICD-10-CM

## 2022-02-25 DIAGNOSIS — L29.0 PRURITUS ANI: ICD-10-CM

## 2022-02-25 DIAGNOSIS — E78.2 MIXED HYPERLIPIDEMIA: ICD-10-CM

## 2022-02-25 DIAGNOSIS — K40.90 UNILATERAL INGUINAL HERNIA WITHOUT OBSTRUCTION OR GANGRENE, RECURRENCE NOT SPECIFIED: ICD-10-CM

## 2022-02-25 PROCEDURE — 99214 OFFICE O/P EST MOD 30 MIN: CPT | Performed by: FAMILY MEDICINE

## 2022-02-25 RX ORDER — ROSUVASTATIN CALCIUM 5 MG/1
5 TABLET, COATED ORAL EVERY EVENING
Qty: 90 TABLET | Refills: 1 | Status: SHIPPED | OUTPATIENT
Start: 2022-02-25

## 2022-02-25 ASSESSMENT — FIBROSIS 4 INDEX: FIB4 SCORE: 1.15

## 2022-02-25 NOTE — ASSESSMENT & PLAN NOTE
Chronic issue  Has had a perianal rash and pruritis   He has had to shower twice daily to keep it calm  Has tried to increase his fiber intake   Has tried buttpaste and vaseline which helps some and relieves the itchiness.   We did a trial of nystatin which didn't help  Now doing non-talc powder  Still present  He already has a dermatologist but has not discussed with them yet

## 2022-02-25 NOTE — PROGRESS NOTES
"Subjective:     Chief Complaint   Patient presents with   • Follow-Up       HPI:   Brandon presents today to discuss the following.    Pruritus ani  Chronic issue  Has had a perianal rash and pruritis   He has had to shower twice daily to keep it calm  Has tried to increase his fiber intake   Has tried buttpaste and vaseline which helps some and relieves the itchiness.   We did a trial of nystatin which didn't help  Now doing non-talc powder  Still present  He already has a dermatologist but has not discussed with them yet    Inguinal hernia  Chronic issue  US positive for right inguinal hernia.   Unable to establish with surgery due to insurance     Mixed hyperlipidemia  Chronic issue  The 10-year ASCVD risk score (Baileypaul PRESCOTT Jr., et al., 2013) is: 9.2%   Not on statin        History reviewed. No pertinent past medical history.    Current Outpatient Medications Ordered in Epic   Medication Sig Dispense Refill   • rosuvastatin (CRESTOR) 5 MG Tab Take 1 Tablet by mouth every evening. 90 Tablet 1   • neomycin-polymixin-dexamethasone (MAXITROL) 3.5-73335-9.1 Ointment ophthalmic ointment      • Zinc 10 MG Lozenge      • Vitamin D-Vitamin K (VITAMIN K2-VITAMIN D3)  MCG-UNIT Cap      • Magnesium 100 MG Cap        No current Epic-ordered facility-administered medications on file.       Allergies:  Anesthesia s-i-40 [kdc:egg phospholipids+sodium metabisulfite+soybean oil+propofol]    Health Maintenance: Completed    ROS:  Gen: no fevers/chills, no changes in weight  Eyes: no changes in vision  Pulm: no sob, no cough  CV: no chest pain, no palpitations  GI: no nausea/vomiting, no diarrhea      Objective:     Exam:  /80 (BP Location: Right arm, Patient Position: Sitting, BP Cuff Size: Adult)   Pulse 67   Temp 36.2 °C (97.2 °F) (Temporal)   Resp 18   Ht 1.803 m (5' 11\")   Wt 86.6 kg (191 lb)   SpO2 97%   BMI 26.64 kg/m²  Body mass index is 26.64 kg/m².        Constitutional: Alert, no distress, " well-groomed.  Skin: Warm, dry, good turgor, no rashes in visible areas.  Eye: Equal, round and reactive, conjunctiva clear, lids normal.  ENMT: Lips without lesions, good dentition, moist mucous membranes.  Neck: Trachea midline, no masses, no thyromegaly.  Respiratory: Unlabored respiratory effort, no cough.  MSK: Normal gait, moves all extremities.  Neuro: Grossly non-focal.   Psych: Alert and oriented x3, normal affect and mood.        Assessment & Plan:     57 y.o. male with the following -     1. Pruritus ani  Chronic, unchanged condition.  The patient continues to have a persistent rash in the gluteal folds.  Did not respond well to nystatin cream.  At this time I strongly recommend he connect with his dermatologist to go over this rash.  I kindly appreciate dermatology's recommendations.    2. Unilateral inguinal hernia without obstruction or gangrene, recurrence not specified  Chronic, stable condition.  Asymptomatic at this time.  He is in the process of establishing with a surgeon.    3. Mixed hyperlipidemia  Chronic, stable condition.  Discussed treatment options and he is amenable to possibly starting low-dose rosuvastatin.  He will do further research on this.  - rosuvastatin (CRESTOR) 5 MG Tab; Take 1 Tablet by mouth every evening.  Dispense: 90 Tablet; Refill: 1    4. Screening for colorectal cancer  - Referral to GI for Colonoscopy      Return in about 3 months (around 5/25/2022).    Please note that this dictation was created using voice recognition software. I have made every reasonable attempt to correct obvious errors, but I expect that there are errors of grammar and possibly content that I did not discover before finalizing the note.

## 2022-02-25 NOTE — ASSESSMENT & PLAN NOTE
Chronic issue  The 10-year ASCVD risk score (Bailey PRESCOTT Jr., et al., 2013) is: 9.2%   Not on statin

## 2022-02-25 NOTE — ASSESSMENT & PLAN NOTE
Chronic issue  US positive for right inguinal hernia.   Unable to establish with surgery due to insurance

## 2023-09-22 ENCOUNTER — DOCUMENTATION (OUTPATIENT)
Dept: HEALTH INFORMATION MANAGEMENT | Facility: OTHER | Age: 58
End: 2023-09-22
Payer: COMMERCIAL

## 2024-02-15 ENCOUNTER — TELEPHONE (OUTPATIENT)
Dept: HEALTH INFORMATION MANAGEMENT | Facility: OTHER | Age: 59
End: 2024-02-15
Payer: COMMERCIAL